# Patient Record
Sex: FEMALE | URBAN - METROPOLITAN AREA
[De-identification: names, ages, dates, MRNs, and addresses within clinical notes are randomized per-mention and may not be internally consistent; named-entity substitution may affect disease eponyms.]

---

## 2020-02-26 ENCOUNTER — COMMUNICATION - HEALTHEAST (OUTPATIENT)
Dept: SCHEDULING | Facility: CLINIC | Age: 20
End: 2020-02-26

## 2021-06-06 NOTE — TELEPHONE ENCOUNTER
"Last month the Dr gave her a pill. This time it is painful. Why is the pill not working. She is also using Abreva cream from [facility] but it is not helping. 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 RX Falacyclovir 1 gm two tablets twice a day for one day. Couldn't sleep all night. Currently pain=\"9-10\". She has not taken Tylenol or Ibuprofen for the pain yet.  Usually breaks out 1-2 times per year. Sun exposure sometimes trigger it. She was in the sun recently. Usually the medication helps, but it is not working this time.       Reason for Disposition    Cold sores without complications    Protocols used: COLD SORES (FEVER BLISTERS)-A-    Discussed options: she is interested in seeing a provider tonight.   Given information for [facility].    Malena Keane RN Triage Nurse Advisor  "